# Patient Record
Sex: FEMALE | Race: WHITE | Employment: UNEMPLOYED | URBAN - METROPOLITAN AREA
[De-identification: names, ages, dates, MRNs, and addresses within clinical notes are randomized per-mention and may not be internally consistent; named-entity substitution may affect disease eponyms.]

---

## 2017-05-23 DIAGNOSIS — G93.9 BRAIN LESION: Primary | ICD-10-CM

## 2017-06-05 ENCOUNTER — OFFICE VISIT (OUTPATIENT)
Dept: NEUROSURGERY | Facility: CLINIC | Age: 57
End: 2017-06-05
Attending: NEUROLOGICAL SURGERY
Payer: MEDICARE

## 2017-06-05 VITALS
TEMPERATURE: 98.5 F | HEIGHT: 67 IN | BODY MASS INDEX: 22.54 KG/M2 | WEIGHT: 143.6 LBS | SYSTOLIC BLOOD PRESSURE: 102 MMHG | HEART RATE: 64 BPM | OXYGEN SATURATION: 100 % | DIASTOLIC BLOOD PRESSURE: 56 MMHG | RESPIRATION RATE: 16 BRPM

## 2017-06-05 DIAGNOSIS — G93.9 BRAIN LESION: Primary | ICD-10-CM

## 2017-06-05 PROCEDURE — 99212 OFFICE O/P EST SF 10 MIN: CPT | Mod: ZF

## 2017-06-05 ASSESSMENT — PAIN SCALES - GENERAL: PAINLEVEL: NO PAIN (0)

## 2017-06-05 NOTE — NURSING NOTE
"Oncology Rooming Note    June 5, 2017 2:50 PM   Fartun Guerrero is a 56 year old female who presents for:    Chief Complaint   Patient presents with     Oncology Clinic Visit     [facility] follow up     Initial Vitals: /56 (BP Location: Left arm, Patient Position: Chair, Cuff Size: Adult Regular)  Pulse 64  Temp 98.5  F (36.9  C) (Oral)  Resp 16  Ht 1.689 m (5' 6.5\")  Wt 65.1 kg (143 lb 9.6 oz)  SpO2 100%  BMI 22.83 kg/m2 Estimated body mass index is 22.83 kg/(m^2) as calculated from the following:    Height as of this encounter: 1.689 m (5' 6.5\").    Weight as of this encounter: 65.1 kg (143 lb 9.6 oz). Body surface area is 1.75 meters squared.  No Pain (0) Comment: Data Unavailable   No LMP recorded.  Allergies reviewed: Yes  Medications reviewed: Yes    Medications: Medication refills not needed today.  Pharmacy name entered into EPIC: Data Unavailable    Clinical concerns: Patient unsure of current medications taking. Attempted to review.     10 minutes for nursing intake (face to face time)     Dustin Ch LPN            "

## 2017-06-05 NOTE — MR AVS SNAPSHOT
"              After Visit Summary   2017    Fartun Guerrero    MRN: [id number]           Patient Information     Date Of Birth          1960        Visit Information        Provider Department      2017 2:30 PM Alejandro Braun MD [facility]        Today's Diagnoses     Brain lesion    -  1       Follow-ups after your visit        Who to contact     If you have questions or need follow up information about today's clinic visit or your schedule please contact [facility] directly at [phone].  Normal or non-critical lab and imaging results will be communicated to you by Vusayhart, letter or phone within 4 business days after the clinic has received the results. If you do not hear from us within 7 days, please contact the clinic through Vusayhart or phone. If you have a critical or abnormal lab result, we will notify you by phone as soon as possible.  Submit refill requests through Nexvet or call your pharmacy and they will forward the refill request to us. Please allow 3 business days for your refill to be completed.          Additional Information About Your Visit        MyChart Information     Nexvet lets you send messages to your doctor, view your test results, renew your prescriptions, schedule appointments and more. To sign up, go to [email or web address] . Click on \"Log in\" on the left side of the screen, which will take you to the Welcome page. Then click on \"Sign up Now\" on the right side of the page.     You will be asked to enter the access code listed below, as well as some personal information. Please follow the directions to create your username and password.     Your access code is: [email or web address]  Expires: 2017 12:40 PM     Your access code will  in 90 days. If you need help or a new code, please call your [facility] clinic or [phone].        Care EveryWhere ID     This is your Care EveryWhere ID. This could be used by other " "organizations to access your [facility] medical records  [id number]        Your Vitals Were     Pulse Temperature Respirations Height Pulse Oximetry BMI (Body Mass Index)    64 98.5  F (36.9  C) (Oral) 16 1.689 m (5' 6.5\") 100% 22.83 kg/m2       Blood Pressure from Last 3 Encounters:   06/05/17 102/56   05/18/15 121/71   04/07/14 116/72    Weight from Last 3 Encounters:   06/05/17 65.1 kg (143 lb 9.6 oz)   05/18/15 63.8 kg (140 lb 11.2 oz)   04/07/14 67.6 kg (149 lb)              Today, you had the following     No orders found for display       Primary Care Provider Office Phone # Fax #    Bin Mancera [phone]       [facility] [address] [facility] [address]        Thank you!     Thank you for choosing [facility]  for your care. Our goal is always to provide you with excellent care. Hearing back from our patients is one way we can continue to improve our services. Please take a few minutes to complete the written survey that you may receive in the mail after your visit with us. Thank you!             Your Updated Medication List - Protect others around you: Learn how to safely use, store and throw away your medicines at www.disposemymeds.org.          This list is accurate as of: 6/5/17 11:59 PM.  Always use your most recent med list.                   Brand Name Dispense Instructions for use    ACTEMRA 80 MG/4ML   Generic drug:  tocilizumab      Inject into the vein once 80mg/kg IV monthly       FOLIC ACID PO      Take 2 mg by mouth daily       IBUPROFEN PO      Take 1 tablet by mouth every 8 hours as needed for moderate pain       LAMOTRIGINE PO      Take 100 mg by mouth 2 times daily       LORazepam 1 MG tablet    ATIVAN    15 tablet    Take 1 tablet by mouth every 6 hours as needed for anxiety.       methotrexate 2.5 MG tablet CHEMO      Take 10 tablets by mouth once a week       minoxidil 2 % external solution    ROGAINE     Apply  topically " 2 times daily.       sertraline 25 MG tablet    ZOLOFT     Take 25 mg by mouth daily

## 2017-06-05 NOTE — LETTER
6/5/2017       RE: Fartun Guerrero  [address] [facility] [address]     Dear Colleague,    Thank you for referring your patient, Fartun Guerrero, to the [facility]. Please see a copy of my visit note below.    Please see dictated note #[id number].    HISTORY OF PRESENT ILLNESS:  Ms. 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  She has had a history of rheumatoid arthritis and traumatic brain injury and was last seen in Neurosurgery Clinic by Dr. Harmon 05/18/2015.      Since last being seen in Neurosurgery Clinic, Ms. Guerrero reports she continues to have intermittent headaches, as well as difficulties with short-term memory loss.  However, she does report improvement of cognitive fogginess, as well as no recent seizures.  She does report that with regard to seizures is now no longer taking Keppra as this has affected her mood and she is now taking lamotrigine.      PHYSICAL EXAMINATION:   GENERAL:  This is a middle-aged female sitting in the exam chair with persistent akathitic movements of her limbs, upper torso, as well as her face.   CRANIAL NERVES:  II-XII are intact.   STRENGTH:  5/5 and symmetric in the upper and lower extremities.  There is no pronator drift noted.      IMAGING:  MRI imaging of the brain with and without contrast acquired 06/05/2017 was viewed and demonstrates no change in the left mesial temporal lesion.      ASSESSMENT/PLAN:  Ms. Guerrero is a 56-year-old female with a left mesial temporal lesion which continues to be stable.  Given that over several years there has been no change in the lesion, we will see Ms. Guerrero back in Neurosurgery Clinic in 2-3 years with an MRI of the brain with and without contrast prior to that visit.            MARCELLA HARMON MD       As dictated by JOEY IVAN MD, PHD, PGY1 neurosurgery resident.             D: 06/05/2017 17:59   T: 06/06/2017 08:28   MT: EVELYN       Name:     JERI BENITEZ   MRN:      [id number]        Account:      [id number]   :      1960           Service Date: 2017      Document: [id number]

## 2017-06-06 NOTE — PROGRESS NOTES
HISTORY OF PRESENT ILLNESS:  Ms. 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  She has had a history of rheumatoid arthritis and traumatic brain injury and was last seen in Neurosurgery Clinic by Dr. Harmon 2015.      Since last being seen in Neurosurgery Clinic, Ms. Benitez reports she continues to have intermittent headaches, as well as difficulties with short-term memory loss.  However, she does report improvement of cognitive fogginess, as well as no recent seizures.  She does report that with regard to seizures is now no longer taking Keppra as this has affected her mood and she is now taking lamotrigine.      PHYSICAL EXAMINATION:   GENERAL:  This is a middle-aged female sitting in the exam chair with persistent akathitic movements of her limbs, upper torso, as well as her face.   CRANIAL NERVES:  II-XII are intact.   STRENGTH:  5/5 and symmetric in the upper and lower extremities.  There is no pronator drift noted.      IMAGING:  MRI imaging of the brain with and without contrast acquired 2017 was viewed and demonstrates no change in the left mesial temporal lesion.      ASSESSMENT/PLAN:  Ms. Benitez is a 56-year-old female with a left mesial temporal lesion which continues to be stable.  Given that over several years there has been no change in the lesion, we will see Ms. Benitez back in Neurosurgery Clinic in 2-3 years with an MRI of the brain with and without contrast prior to that visit.            MARCELLA HARMON MD       As dictated by JOEY IVAN MD, PHD, PGY1 neurosurgery resident.             D: 2017 17:59   T: 2017 08:28   MT: EVELYN      Name:     JERI BENITEZ   MRN:      [id number]        Account:      [id number]   :      1960           Service Date: 2017      Document: [id number]

## 2025-01-31 ENCOUNTER — HOSPITAL ENCOUNTER (EMERGENCY)
Facility: CLINIC | Age: 65
Discharge: HOME | End: 2025-01-31
Attending: EMERGENCY MEDICINE | Admitting: EMERGENCY MEDICINE
Payer: MEDICARE

## 2025-01-31 VITALS
TEMPERATURE: 97 F | RESPIRATION RATE: 20 BRPM | HEART RATE: 66 BPM | SYSTOLIC BLOOD PRESSURE: 101 MMHG | BODY MASS INDEX: 22.99 KG/M2 | HEIGHT: 66 IN | OXYGEN SATURATION: 94 % | DIASTOLIC BLOOD PRESSURE: 66 MMHG | WEIGHT: 143.08 LBS

## 2025-01-31 DIAGNOSIS — M25.511 ACUTE PAIN OF RIGHT SHOULDER: ICD-10-CM

## 2025-01-31 PROCEDURE — 99283 EMERGENCY DEPT VISIT LOW MDM: CPT

## 2025-01-31 RX ORDER — ACETAMINOPHEN AND CODEINE PHOSPHATE 300; 30 MG/1; MG/1
1 TABLET ORAL EVERY 6 HOURS PRN
Qty: 8 TABLET | Refills: 0 | Status: SHIPPED | OUTPATIENT
Start: 2025-01-31 | End: 2025-02-03

## 2025-01-31 ASSESSMENT — ACTIVITIES OF DAILY LIVING (ADL): ADLS_ACUITY_SCORE: 41

## 2025-01-31 NOTE — ED PROVIDER NOTES
"    History     Chief Complaint:  Shoulder Pain       HPI   Fartun Guerrero is a 64 year old female with a history of rheumatoid arthritis and previous damage to her left rotator cuff reports that she has noted pain in the right shoulders with specific motion she attributes to the work that she was doing taking her dog to the dog park started to do the rotator cuff exercises on the right shoulder that with the start to give her pain and then noticed and that she had increasing pain with particular movements there was some numbness over his fourth and fifth digits no focal weakness no chest pain no shortness of breath no direct trauma.  The patient called our clinic was told to come to the ER.          Review of External Notes:  1/3/2025 note reviewed for history    Medications:    acetaminophen-codeine (TYLENOL #3) 300-30 MG per tablet  FOLIC ACID PO  IBUPROFEN PO  LAMOTRIGINE PO  LORazepam (ATIVAN) 1 MG tablet  methotrexate 2.5 MG tablet  minoxidil (ROGAINE) 2 % external solution  sertraline (ZOLOFT) 25 MG tablet  tocilizumab (ACTEMRA) 80 MG/4ML        Past Medical History:    Past Medical History:   Diagnosis Date    Hypothyroidism     Rheumatoid arthritis(714.0)        Past Surgical History:    No past surgical history on file.       Physical Exam   Patient Vitals for the past 24 hrs:   BP Temp Pulse Resp SpO2 Height Weight   01/31/25 1234 101/66 -- 66 20 94 % -- --   01/31/25 1016 (!) 145/100 97  F (36.1  C) 84 18 98 % 1.676 m (5' 6\") 64.9 kg (143 lb 1.3 oz)        Physical Exam  General:  No respiratory distress    Cardiovascular: Good cap refill.    Respiratory: Breathing non labored.     Musculoskeletal: There is no lateral tenderness of the right shoulder no tenderness over the scapula nor clavicle no focal joint swelling she can abduct and abduct the arm however with external rotation of the shoulder she has some a pain there is also mild tenderness palpation anteriorly over the shoulder.  Patient " declined repeat x-ray.    Skin: No rashes or petechiae     Neurologic: non focal      Psychiatric: Appropriate      Emergency Department Course       Imaging:  No orders to display       Laboratory:  Labs Ordered and Resulted from Time of ED Arrival to Time of ED Departure - No data to display     Procedures       Emergency Department Course & Assessments:    Interventions:  Medications - No data to display     Assessments:      Independent Interpretation (X-rays, CTs, rhythm strip):  The patient declined x-rays         Disposition:  The patient was discharged.    Impression & Plan           Medical Decision Making:  The patient had had a previous steroid injection in her shoulder had been complaining of some pain and then when doing exercises for rotator cuff injury developed pain I think likely there is a partial tear I did consider infected joint referred pain from things such as ACS but feel these are unlikely I do not think there is an intrathoracic cause it is purely tender to palpation and with movement I did refer to Ortho as an outpatient write her for pain medication and she was discharged home in good condition.  I have no I do not think there is dissection or infection.  Distal pulses intact.    Diagnosis:    ICD-10-CM    1. Acute pain of right shoulder  M25.511            Discharge Medications:  Discharge Medication List as of 1/31/2025  1:30 PM        START taking these medications    Details   acetaminophen-codeine (TYLENOL #3) 300-30 MG per tablet Take 1 tablet by mouth every 6 hours as needed for moderate to severe pain., Disp-8 tablet, R-0, Local Print                  1/31/2025   Randal Story MD Farnan, Christopher M, MD  01/31/25 6163

## 2025-01-31 NOTE — ED TRIAGE NOTES
Patient ambulatory reporting atraumatic right shoulder pain since yesterday. Patient has torn rotator cuff, had a cortisone shot in that shoulder a few weeks ago, and sees PT. Reports taking muscle relaxer and tramadol at home with minimal relief.

## 2025-02-14 ENCOUNTER — TRANSFERRED RECORDS (OUTPATIENT)
Dept: HEALTH INFORMATION MANAGEMENT | Facility: CLINIC | Age: 65
End: 2025-02-14
Payer: MEDICARE